# Patient Record
Sex: FEMALE | Race: WHITE | NOT HISPANIC OR LATINO | Employment: UNEMPLOYED | URBAN - METROPOLITAN AREA
[De-identification: names, ages, dates, MRNs, and addresses within clinical notes are randomized per-mention and may not be internally consistent; named-entity substitution may affect disease eponyms.]

---

## 2017-08-28 ENCOUNTER — OFFICE VISIT (OUTPATIENT)
Dept: ORTHOPEDIC SURGERY | Facility: CLINIC | Age: 52
End: 2017-08-28

## 2017-08-28 VITALS — TEMPERATURE: 97.6 F | WEIGHT: 145 LBS | HEIGHT: 64 IN | BODY MASS INDEX: 24.75 KG/M2

## 2017-08-28 DIAGNOSIS — M77.11 RIGHT TENNIS ELBOW: ICD-10-CM

## 2017-08-28 DIAGNOSIS — M25.521 ELBOW PAIN, RIGHT: Primary | ICD-10-CM

## 2017-08-28 PROCEDURE — 20605 DRAIN/INJ JOINT/BURSA W/O US: CPT | Performed by: ORTHOPAEDIC SURGERY

## 2017-08-28 PROCEDURE — 99243 OFF/OP CNSLTJ NEW/EST LOW 30: CPT | Performed by: ORTHOPAEDIC SURGERY

## 2017-08-28 PROCEDURE — 73070 X-RAY EXAM OF ELBOW: CPT | Performed by: ORTHOPAEDIC SURGERY

## 2017-08-28 RX ORDER — METHYLPREDNISOLONE ACETATE 80 MG/ML
80 INJECTION, SUSPENSION INTRA-ARTICULAR; INTRALESIONAL; INTRAMUSCULAR; SOFT TISSUE
Status: COMPLETED | OUTPATIENT
Start: 2017-08-28 | End: 2017-08-28

## 2017-08-28 RX ORDER — BUPIVACAINE HYDROCHLORIDE 5 MG/ML
1 INJECTION, SOLUTION EPIDURAL; INTRACAUDAL
Status: COMPLETED | OUTPATIENT
Start: 2017-08-28 | End: 2017-08-28

## 2017-08-28 RX ORDER — IBUPROFEN 200 MG
200 TABLET ORAL EVERY 6 HOURS PRN
COMMUNITY
End: 2019-10-22 | Stop reason: ALTCHOICE

## 2017-08-28 RX ORDER — LIDOCAINE HYDROCHLORIDE 10 MG/ML
1 INJECTION, SOLUTION EPIDURAL; INFILTRATION; INTRACAUDAL; PERINEURAL
Status: COMPLETED | OUTPATIENT
Start: 2017-08-28 | End: 2017-08-28

## 2017-08-28 RX ADMIN — LIDOCAINE HYDROCHLORIDE 1 ML: 10 INJECTION, SOLUTION EPIDURAL; INFILTRATION; INTRACAUDAL; PERINEURAL at 10:38

## 2017-08-28 RX ADMIN — METHYLPREDNISOLONE ACETATE 80 MG: 80 INJECTION, SUSPENSION INTRA-ARTICULAR; INTRALESIONAL; INTRAMUSCULAR; SOFT TISSUE at 10:38

## 2017-08-28 RX ADMIN — BUPIVACAINE HYDROCHLORIDE 1 ML: 5 INJECTION, SOLUTION EPIDURAL; INTRACAUDAL at 10:38

## 2017-08-28 NOTE — PATIENT INSTRUCTIONS
Lateral Epicondylitis With Rehab  Lateral epicondylitis involves inflammation and pain around the outer portion of the elbow. The pain is caused by inflammation of the tendons in the forearm that bring back (extend) the wrist. Lateral epicondylitis is also called tennis elbow, because it is very common in tennis players. However, it may occur in any individual who extends the wrist repetitively. If lateral epicondylitis is left untreated, it may become a chronic problem.  SYMPTOMS   · Pain, tenderness, and inflammation on the outer (lateral) side of the elbow.  · Pain or weakness with gripping activities.  · Pain that increases with wrist-twisting motions (playing tennis, using a screwdriver, opening a door or a jar).  · Pain with lifting objects, including a coffee cup.  CAUSES   Lateral epicondylitis is caused by inflammation of the tendons that extend the wrist. Causes of injury may include:  · Repetitive stress and strain on the muscles and tendons that extend the wrist.  · Sudden change in activity level or intensity.  · Incorrect  in racquet sports.  · Incorrect  size of racquet (often too large).  · Incorrect hitting position or technique (usually backhand, leading with the elbow).  · Using a racket that is too heavy.  RISK INCREASES WITH:  · Sports or occupations that require repetitive and/or strenuous forearm and wrist movements (tennis, squash, racquetball, carpentry).  · Poor wrist and forearm strength and flexibility.  · Failure to warm up properly before activity.  · Resuming activity before healing, rehabilitation, and conditioning are complete.  PREVENTION   · Warm up and stretch properly before activity.  · Maintain physical fitness:    Strength, flexibility, and endurance.    Cardiovascular fitness.  · Wear and use properly fitted equipment.  · Learn and use proper technique and have a  correct improper technique.  · Wear a tennis elbow (counterforce) brace.  PROGNOSIS   The course of  this condition depends on the degree of the injury. If treated properly, acute cases (symptoms lasting less than 4 weeks) are often resolved in 2 to 6 weeks. Chronic (longer lasting cases) often resolve in 3 to 6 months but may require physical therapy.  RELATED COMPLICATIONS   · Frequently recurring symptoms, resulting in a chronic problem. Properly treating the problem the first time decreases frequency of recurrence.  · Chronic inflammation, scarring tendon degeneration, and partial tendon tear, requiring surgery.  · Delayed healing or resolution of symptoms.  TREATMENT   Treatment first involves the use of ice and medicine to reduce pain and inflammation. Strengthening and stretching exercises may help reduce discomfort if performed regularly. These exercises may be performed at home if the condition is an acute injury. Chronic cases may require a referral to a physical therapist for evaluation and treatment. Your caregiver may advise a corticosteroid injection to help reduce inflammation. Rarely, surgery is needed.  MEDICATION  · If pain medicine is needed, nonsteroidal anti-inflammatory medicines (aspirin and ibuprofen), or other minor pain relievers (acetaminophen), are often advised.  · Do not take pain medicine for 7 days before surgery.  · Prescription pain relievers may be given, if your caregiver thinks they are needed. Use only as directed and only as much as you need.  · Corticosteroid injections may be recommended. These injections should be reserved only for the most severe cases, because they can only be given a certain number of times.  HEAT AND COLD  · Cold treatment (icing) should be applied for 10 to 15 minutes every 2 to 3 hours for inflammation and pain, and immediately after activity that aggravates your symptoms. Use ice packs or an ice massage.  · Heat treatment may be used before performing stretching and strengthening activities prescribed by your caregiver, physical therapist, or  . Use a heat pack or a warm water soak.  SEEK MEDICAL CARE IF:  Symptoms get worse or do not improve in 2 weeks, despite treatment.  EXERCISES   RANGE OF MOTION (ROM) AND STRETCHING EXERCISES - Epicondylitis, Lateral (Tennis Elbow)  These exercises may help you when beginning to rehabilitate your injury. Your symptoms may go away with or without further involvement from your physician, physical therapist, or . While completing these exercises, remember:   · Restoring tissue flexibility helps normal motion to return to the joints. This allows healthier, less painful movement and activity.  · An effective stretch should be held for at least 30 seconds.  · A stretch should never be painful. You should only feel a gentle lengthening or release in the stretched tissue.  RANGE OF MOTION - Wrist Flexion, Active-Assisted  · Extend your right / left elbow with your fingers pointing down.*  · Gently pull the back of your hand towards you, until you feel a gentle stretch on the top of your forearm.  · Hold this position for __________ seconds.  Repeat __________ times. Complete this exercise __________ times per day.   *If directed by your physician, physical therapist or , complete this stretch with your elbow bent, rather than extended.  RANGE OF MOTION - Wrist Extension, Active-Assisted  · Extend your right / left elbow and turn your palm upwards.*  · Gently pull your palm and fingertips back, so your wrist extends and your fingers point more toward the ground.  · You should feel a gentle stretch on the inside of your forearm.  · Hold this position for __________ seconds.  Repeat __________ times. Complete this exercise __________ times per day.  *If directed by your physician, physical therapist or , complete this stretch with your elbow bent, rather than extended.  STRETCH - Wrist Flexion  · Place the back of your right / left hand on a tabletop, leaving your  elbow slightly bent. Your fingers should point away from your body.  · Gently press the back of your hand down onto the table by straightening your elbow. You should feel a stretch on the top of your forearm.  · Hold this position for __________ seconds.  Repeat __________ times. Complete this stretch __________ times per day.   STRETCH - Wrist Extension   · Place your right / left fingertips on a tabletop, leaving your elbow slightly bent. Your fingers should point backwards.  · Gently press your fingers and palm down onto the table by straightening your elbow. You should feel a stretch on the inside of your forearm.  · Hold this position for __________ seconds.  Repeat __________ times. Complete this stretch __________ times per day.   STRENGTHENING EXERCISES - Epicondylitis, Lateral (Tennis Elbow)  These exercises may help you when beginning to rehabilitate your injury. They may resolve your symptoms with or without further involvement from your physician, physical therapist, or . While completing these exercises, remember:   · Muscles can gain both the endurance and the strength needed for everyday activities through controlled exercises.  · Complete these exercises as instructed by your physician, physical therapist or . Increase the resistance and repetitions only as guided.  · You may experience muscle soreness or fatigue, but the pain or discomfort you are trying to eliminate should never worsen during these exercises. If this pain does get worse, stop and make sure you are following the directions exactly. If the pain is still present after adjustments, discontinue the exercise until you can discuss the trouble with your caregiver.  STRENGTH - Wrist Flexors  · Sit with your right / left forearm palm-up and fully supported on a table or countertop. Your elbow should be resting below the height of your shoulder. Allow your wrist to extend over the edge of the  surface.  · Loosely holding a __________ weight, or a piece of rubber exercise band or tubing, slowly curl your hand up toward your forearm.  · Hold this position for __________ seconds. Slowly lower the wrist back to the starting position in a controlled manner.  Repeat __________ times. Complete this exercise __________ times per day.   STRENGTH - Wrist Extensors  · Sit with your right / left forearm palm-down and fully supported on a table or countertop. Your elbow should be resting below the height of your shoulder. Allow your wrist to extend over the edge of the surface.  · Loosely holding a __________ weight, or a piece of rubber exercise band or tubing, slowly curl your hand up toward your forearm.  · Hold this position for __________ seconds. Slowly lower the wrist back to the starting position in a controlled manner.  Repeat __________ times. Complete this exercise __________ times per day.   STRENGTH - Ulnar Deviators  · Stand with a ____________________ weight in your right / left hand, or sit while holding a rubber exercise band or tubing, with your healthy arm supported on a table or countertop.  · Move your wrist, so that your pinkie travels toward your forearm and your thumb moves away from your forearm.  · Hold this position for __________ seconds and then slowly lower the wrist back to the starting position.  Repeat __________ times. Complete this exercise __________ times per day  STRENGTH - Radial Deviators  · Stand with a ____________________ weight in your right / left hand, or sit while holding a rubber exercise band or tubing, with your injured arm supported on a table or countertop.  · Raise your hand upward in front of you or pull up on the rubber tubing.  · Hold this position for __________ seconds and then slowly lower the wrist back to the starting position.  Repeat __________ times. Complete this exercise __________ times per day.  STRENGTH - Forearm Supinators   · Sit with your right /  "left forearm supported on a table, keeping your elbow below shoulder height. Rest your hand over the edge, palm down.  · Gently  a hammer or a soup ladle.  · Without moving your elbow, slowly turn your palm and hand upward to a \"thumbs-up\" position.  · Hold this position for __________ seconds. Slowly return to the starting position.  Repeat __________ times. Complete this exercise __________ times per day.   STRENGTH - Forearm Pronators   · Sit with your right / left forearm supported on a table, keeping your elbow below shoulder height. Rest your hand over the edge, palm up.  · Gently  a hammer or a soup ladle.  · Without moving your elbow, slowly turn your palm and hand upward to a \"thumbs-up\" position.  · Hold this position for __________ seconds. Slowly return to the starting position.  Repeat __________ times. Complete this exercise __________ times per day.   STRENGTH -   · Grasp a tennis ball, a dense sponge, or a large, rolled sock in your hand.  · Squeeze as hard as you can, without increasing any pain.  · Hold this position for __________ seconds. Release your  slowly.  Repeat __________ times. Complete this exercise __________ times per day.   STRENGTH - Elbow Extensors, Isometric  · Stand or sit upright, on a firm surface. Place your right / left arm so that your palm faces your stomach, and it is at the height of your waist.  · Place your opposite hand on the underside of your forearm. Gently push up as your right / left arm resists. Push as hard as you can with both arms, without causing any pain or movement at your right / left elbow. Hold this stationary position for __________ seconds.  Gradually release the tension in both arms. Allow your muscles to relax completely before repeating.     This information is not intended to replace advice given to you by your health care provider. Make sure you discuss any questions you have with your health care provider.     Document Released: " 12/18/2006 Document Revised: 01/08/2016 Document Reviewed: 09/15/2016  [employer] Interactive Patient Education ©2017 [employer]

## 2017-08-28 NOTE — PROGRESS NOTES
Patient Name: Adrian Aviles   YOB: 1965  Referring Primary Care Physician: Kaelyn Mathews MD      Chief Complaint:    Chief Complaint   Patient presents with   • Right Elbow - Establish Care        Subjective:right elbow pain  HPI:   Adrian Aviles is a pleasant 51 y.o. year old who presents today for evaluation of   Chief Complaint   Patient presents with   • Right Elbow - Establish Care   hx of left elbow, neck and shoulder pain but now with new right elbow pain.  Achy/sharp.  Tried stretching, rest, nsaids.  Has been moving into new house and packing boxes.  Doesn't radiate.  Constant.    This problem is new to this examiner.     Medications:   Home Medications:  Current Outpatient Prescriptions on File Prior to Visit   Medication Sig   • CALCIUM PO Take 1,000 mg by mouth daily.   • Cholecalciferol (VITAMIN D PO) Take 2,000 Units by mouth. Does not specify if tablet or capsule.   • escitalopram (LEXAPRO) 10 MG tablet Take 10 mg by mouth daily.   • MAGNESIUM PO Take 500 mg by mouth daily. Does not specify tablets or capsules.   • [DISCONTINUED] cyclobenzaprine (FLEXERIL) 5 MG tablet Take 1 tablet by mouth 3 (Three) Times a Day As Needed for muscle spasms.     No current facility-administered medications on file prior to visit.      Current Medications:  Scheduled Meds:  Continuous Infusions:  No current facility-administered medications for this visit.   PRN Meds:.    I have reviewed the patient's medical history in detail and updated the computerized patient record.  Review and summarization of old records includes:    Past Medical History:   Diagnosis Date   • Anxiety    • Depression         Past Surgical History:   Procedure Laterality Date   • APPENDECTOMY     • BREAST AUGMENTATION     •  SECTION  1997   • CHOLECYSTECTOMY  1997   • OTHER SURGICAL HISTORY  10/2012    Uterine ablation        Social History     Occupational History   • Not on file.     Social History Main  Topics   • Smoking status: Former Smoker   • Smokeless tobacco: Not on file   • Alcohol use No   • Drug use: No   • Sexual activity: Defer    Social History     Social History Narrative        Family History   Problem Relation Age of Onset   • Lung disease Father    • Diabetes Sister    • Lung cancer Other        ROS: 14 point review of systems was performed and all other systems were reviewed and are negative except for documented findings in HPI and today's encounter.     Allergies: No Known Allergies  Constitutional:  Denies fever, shaking or chills   Eyes:  Denies change in visual acuity   HENT:  Denies nasal congestion or sore throat   Respiratory:  Denies cough or shortness of breath   Cardiovascular:  Denies chest pain or severe LE edema   GI:  Denies abdominal pain, nausea, vomiting, bloody stools or diarrhea   Musculoskeletal:  Numbness, tingling, pain, or loss of motor function only as noted above in history of present illness.  : Denies painful urination or hematuria  Integument:  Denies rash, lesion or ulceration   Neurologic:  Denies headache or focal weakness  Endocrine:  Denies lymphadenopathy  Psych:  Denies confusion or change in mental status   Hem:  Denies active bleeding    Objective:    Physical Exam: 51 y.o. female  Body mass index is 24.89 kg/(m^2)., @WT@, @HT@  Vitals:    08/28/17 0957   Temp: 97.6 °F (36.4 °C)     Vital signs reviewed.   General Appearance:    Alert, cooperative, in no acute distress                  Eyes: conjunctiva clear  ENT: external ears and nose atraumatic  CV: no peripheral edema  Resp: normal respiratory effort  Skin: no rashes or wounds; normal turgor  Psych: mood and affect appropriate  Lymph: no nodes appreciated  Neuro: gross sensation intact  Vascular:  Palpable peripheral pulse in noted extremity  Musculoskeletal Extremities: elbow with pain voer the lateraal ext origin.  good rom and stability.  no effusion    Radiology:   Imaging done today and discussed  at length with the patient:    Indication: pain related symptoms,  Views: 2V AP&LAT right elbow   Findings: mild arthritis  Comparison views: not available      Assessment:     ICD-10-CM ICD-9-CM   1. Elbow pain, right M25.521 719.42   2. Right tennis elbow M77.11 726.32        Medium Joint Arthrocentesis  Date/Time: 8/28/2017 10:38 AM  Timeout: Immediately prior to procedure a time out was called to verify the correct patient, procedure, equipment, support staff and site/side marked as required   Supporting Documentation  Indications: pain   Procedure Details  Location: elbow -   Needle size: 22 G  Medications administered: 1 mL lidocaine PF 1% 1 %; 1 mL bupivacaine (PF) 0.5 %; 80 mg methylPREDNISolone acetate 80 MG/ML  Patient tolerance: patient tolerated the procedure well with no immediate complications             Plan: Biomechanics of pertinent body area discussed.  Risks, benefits, alternatives, comparisons, and complications of accepted medicines, injections, recommendations, surgical procedures, and therapies explained and education provided in laymen's terms. The patient was given the opportunity to ask questions and they were answerved to their satisfaction.   Natural history and expected course of this patient's diagnosis discussed along with evaluation of therapies. Questions answered.  Cortisone Injection for DIAGNOSTIC and THERAPUTIC purposes.  Cryotherapy/brachy therapy as indicated with instructions.    Inject- if no better could do PT or MRI>      8/28/2017

## 2017-11-06 ENCOUNTER — OFFICE VISIT (OUTPATIENT)
Dept: ORTHOPEDIC SURGERY | Facility: CLINIC | Age: 52
End: 2017-11-06

## 2017-11-06 VITALS — HEIGHT: 63 IN | BODY MASS INDEX: 24.8 KG/M2 | TEMPERATURE: 98 F | WEIGHT: 140 LBS

## 2017-11-06 DIAGNOSIS — M77.11 RIGHT TENNIS ELBOW: Primary | ICD-10-CM

## 2017-11-06 PROCEDURE — 99213 OFFICE O/P EST LOW 20 MIN: CPT | Performed by: ORTHOPAEDIC SURGERY

## 2017-11-06 RX ORDER — ESTRADIOL 0.05 MG/D
1 FILM, EXTENDED RELEASE TRANSDERMAL WEEKLY
COMMUNITY
Start: 2017-09-25

## 2017-11-06 RX ORDER — METHYLPREDNISOLONE 4 MG/1
TABLET ORAL
Qty: 21 TABLET | Refills: 0 | Status: SHIPPED | OUTPATIENT
Start: 2017-11-06 | End: 2018-01-03

## 2017-11-06 NOTE — PROGRESS NOTES
Patient Name: Adrian Aviles   YOB: 1965  Referring Primary Care Physician: Kaelyn Mathews MD    2  Chief Complaint:    Chief Complaint   Patient presents with   • Right Elbow - Follow-up, Pain        Subjective:    HPI:   Adrian Aviles is a pleasant 52 y.o. year old who presents today for evaluation of   Chief Complaint   Patient presents with   • Right Elbow - Follow-up, Pain   .  Had bad flare of right tennis elbow.  inejcted about 2 months ago.  Digging irrigation ditches - inflammed again.  Stretching. [place], ice.  Better in morning and worse in the afternoon.     This problem is not new to this examiner.     Medications:   Home Medications:  Current Outpatient Prescriptions on File Prior to Visit   Medication Sig   • CALCIUM PO Take 1,000 mg by mouth daily.   • Cholecalciferol (VITAMIN D PO) Take 2,000 Units by mouth. Does not specify if tablet or capsule.   • escitalopram (LEXAPRO) 10 MG tablet Take 10 mg by mouth daily.   • ibuprofen (ADVIL,MOTRIN) 200 MG tablet Take 200 mg by mouth Every 6 (Six) Hours As Needed for Mild Pain .   • MAGNESIUM PO Take 500 mg by mouth daily. Does not specify tablets or capsules.     No current facility-administered medications on file prior to visit.      Current Medications:  Scheduled Meds:  Continuous Infusions:  No current facility-administered medications for this visit.   PRN Meds:.    I have reviewed the patient's medical history in detail and updated the computerized patient record.  Review and summarization of old records includes:    Past Medical History:   Diagnosis Date   • Anxiety    • Depression         Past Surgical History:   Procedure Laterality Date   • APPENDECTOMY     • BREAST AUGMENTATION     •  SECTION  1997   • CHOLECYSTECTOMY  1997   • OTHER SURGICAL HISTORY  10/2012    Uterine ablation        Social History     Occupational History   • Not on file.     Social History Main Topics   • Smoking status: Former Smoker   •  Smokeless tobacco: Never Used   • Alcohol use No   • Drug use: No   • Sexual activity: Defer    Social History     Social History Narrative        Family History   Problem Relation Age of Onset   • Lung disease Father    • Diabetes Sister    • Lung cancer Other        ROS: 14 point review of systems was performed and all other systems were reviewed and are negative except for documented findings in HPI and today's encounter.     Allergies: No Known Allergies  Constitutional:  Denies fever, shaking or chills   Eyes:  Denies change in visual acuity   HENT:  Denies nasal congestion or sore throat   Respiratory:  Denies cough or shortness of breath   Cardiovascular:  Denies chest pain or severe LE edema   GI:  Denies abdominal pain, nausea, vomiting, bloody stools or diarrhea   Musculoskeletal:  Numbness, tingling, pain, or loss of motor function only as noted above in history of present illness.  : Denies painful urination or hematuria  Integument:  Denies rash, lesion or ulceration   Neurologic:  Denies headache or focal weakness  Endocrine:  Denies lymphadenopathy  Psych:  Denies confusion or change in mental status   Hem:  Denies active bleeding    Objective:    Physical Exam: 52 y.o. female  Body mass index is 24.8 kg/(m^2)., @WT@, @HT@  Vitals:    11/06/17 0858   Temp: 98 °F (36.7 °C)     Vital signs reviewed.   General Appearance:    Alert, cooperative, in no acute distress                  Eyes: conjunctiva clear  ENT: external ears and nose atraumatic  CV: no peripheral edema  Resp: normal respiratory effort  Skin: no rashes or wounds; normal turgor  Psych: mood and affect appropriate  Lymph: no nodes appreciated  Neuro: gross sensation intact  Vascular:  Palpable peripheral pulse in noted extremity  Musculoskeletal Extremities: tender over the lat epicondyle on the right, resisted ext/passive lfexion hurt, pulse and sensation intact    Radiology:   Imaging done previously in the office and discussed at  length with the patient:    Indication: pain related symptoms,  Views: 2V AP&LAT right elbow   Findings: mild arthritis  Comparison views: not available      Assessment:     ICD-10-CM ICD-9-CM   1. Right tennis elbow M77.11 726.32        Procedures       Plan: Biomechanics of pertinent body area discussed.  Risks, benefits, alternatives, comparisons, and complications of accepted medicines, injections, recommendations, surgical procedures, and therapies explained and education provided in laymen's terms. The patient was given the opportunity to ask questions and they were answerved to their satisfaction.   Natural history and expected course of this patient's diagnosis discussed along with evaluation of therapies. Questions answered.  OTC analgesics as needed with dosage warning and instructions given.  Cryotherapy/brachy therapy as indicated with instructions.   PT referral offered and declined, advised on stretching/strengthening exercises.   Medrol dosepack athe nreume nsaids with regularity and warnings.  Consider injectio in aboutr 6 wks when back from [facility] if no better.      11/6/2017

## 2018-01-03 ENCOUNTER — OFFICE VISIT (OUTPATIENT)
Dept: ORTHOPEDIC SURGERY | Facility: CLINIC | Age: 53
End: 2018-01-03

## 2018-01-03 VITALS — WEIGHT: 141 LBS | HEIGHT: 63 IN | BODY MASS INDEX: 24.98 KG/M2 | TEMPERATURE: 99.7 F

## 2018-01-03 DIAGNOSIS — M77.11 RIGHT TENNIS ELBOW: Primary | ICD-10-CM

## 2018-01-03 PROCEDURE — 99213 OFFICE O/P EST LOW 20 MIN: CPT | Performed by: NURSE PRACTITIONER

## 2018-01-03 PROCEDURE — 20605 DRAIN/INJ JOINT/BURSA W/O US: CPT | Performed by: NURSE PRACTITIONER

## 2018-01-03 RX ORDER — METHYLPREDNISOLONE ACETATE 80 MG/ML
80 INJECTION, SUSPENSION INTRA-ARTICULAR; INTRALESIONAL; INTRAMUSCULAR; SOFT TISSUE
Status: COMPLETED | OUTPATIENT
Start: 2018-01-03 | End: 2018-01-03

## 2018-01-03 RX ORDER — VALACYCLOVIR HYDROCHLORIDE 1 G/1
TABLET, FILM COATED ORAL AS NEEDED
COMMUNITY
Start: 2017-11-27

## 2018-01-03 RX ORDER — LIDOCAINE HYDROCHLORIDE 10 MG/ML
1 INJECTION, SOLUTION EPIDURAL; INFILTRATION; INTRACAUDAL; PERINEURAL
Status: COMPLETED | OUTPATIENT
Start: 2018-01-03 | End: 2018-01-03

## 2018-01-03 RX ORDER — BUPIVACAINE HYDROCHLORIDE 5 MG/ML
1 INJECTION, SOLUTION EPIDURAL; INTRACAUDAL
Status: COMPLETED | OUTPATIENT
Start: 2018-01-03 | End: 2018-01-03

## 2018-01-03 RX ADMIN — LIDOCAINE HYDROCHLORIDE 1 ML: 10 INJECTION, SOLUTION EPIDURAL; INFILTRATION; INTRACAUDAL; PERINEURAL at 15:35

## 2018-01-03 RX ADMIN — METHYLPREDNISOLONE ACETATE 80 MG: 80 INJECTION, SUSPENSION INTRA-ARTICULAR; INTRALESIONAL; INTRAMUSCULAR; SOFT TISSUE at 15:35

## 2018-01-03 RX ADMIN — BUPIVACAINE HYDROCHLORIDE 1 ML: 5 INJECTION, SOLUTION EPIDURAL; INTRACAUDAL at 15:35

## 2018-01-03 NOTE — PROGRESS NOTES
Patient Name: Adrian Aviles   YOB: 1965  Referring Primary Care Physician: Kaelyn Mathews MD      Chief Complaint:    Chief Complaint   Patient presents with   • Right Elbow - Follow-up, Pain        Subjective:    HPI:   Adrian Aviles is a pleasant 52 y.o. year old who presents today for evaluation of   Chief Complaint   Patient presents with   • Right Elbow - Follow-up, Pain   .  right elbow tender at the extensor origin and with passive stretch.  Tried icing, extercises, nsaids and medrol.  Injection about 3 months ago    This problem is not new to this examiner.     Medications:   Home Medications:  Current Outpatient Prescriptions on File Prior to Visit   Medication Sig   • CALCIUM PO Take 1,000 mg by mouth daily.   • Cholecalciferol (VITAMIN D PO) Take 2,000 Units by mouth. Does not specify if tablet or capsule.   • escitalopram (LEXAPRO) 10 MG tablet Take 10 mg by mouth daily.   • estradiol (MINIVELLE) 0.05 MG/24HR patch Place 1 patch on the skin 1 (One) Time Per Week.   • ibuprofen (ADVIL,MOTRIN) 200 MG tablet Take 200 mg by mouth Every 6 (Six) Hours As Needed for Mild Pain .   • MAGNESIUM PO Take 500 mg by mouth daily. Does not specify tablets or capsules.   • progesterone (PROMETRIUM) 100 MG capsule Take 100 mg by mouth Daily.   • [DISCONTINUED] MethylPREDNISolone (MEDROL, IZZY,) 4 MG tablet Take as directed on package instructions.     No current facility-administered medications on file prior to visit.      Current Medications:  Scheduled Meds:  Continuous Infusions:  No current facility-administered medications for this visit.   PRN Meds:.    I have reviewed the patient's medical history in detail and updated the computerized patient record.  Review and summarization of old records includes:    Past Medical History:   Diagnosis Date   • Anxiety    • Depression         Past Surgical History:   Procedure Laterality Date   • APPENDECTOMY     • BREAST AUGMENTATION     •  SECTION   02/1997   • CHOLECYSTECTOMY  04/1997   • OTHER SURGICAL HISTORY  10/2012    Uterine ablation        Social History     Occupational History   • Not on file.     Social History Main Topics   • Smoking status: Former Smoker   • Smokeless tobacco: Never Used   • Alcohol use No   • Drug use: No   • Sexual activity: Defer    Social History     Social History Narrative        Family History   Problem Relation Age of Onset   • Lung disease Father    • Diabetes Sister    • Lung cancer Other        ROS: 14 point review of systems was performed and all other systems were reviewed and are negative except for documented findings in HPI and today's encounter.     Allergies: No Known Allergies  Constitutional:  Denies fever, shaking or chills   Eyes:  Denies change in visual acuity   HENT:  Denies nasal congestion or sore throat   Respiratory:  Denies cough or shortness of breath   Cardiovascular:  Denies chest pain or severe LE edema   GI:  Denies abdominal pain, nausea, vomiting, bloody stools or diarrhea   Musculoskeletal:  Numbness, tingling, pain, or loss of motor function only as noted above in history of present illness.  : Denies painful urination or hematuria  Integument:  Denies rash, lesion or ulceration   Neurologic:  Denies headache or focal weakness  Endocrine:  Denies lymphadenopathy  Psych:  Denies confusion or change in mental status   Hem:  Denies active bleeding    Objective:    Physical Exam: 52 y.o. female  Body mass index is 24.98 kg/(m^2)., @WT@, @HT@  Vitals:    01/03/18 1523   Temp: 99.7 °F (37.6 °C)     Vital signs reviewed.   General Appearance:    Alert, cooperative, in no acute distress                  Eyes: conjunctiva clear  ENT: external ears and nose atraumatic  CV: no peripheral edema  Resp: normal respiratory effort  Skin: no rashes or wounds; normal turgor  Psych: mood and affect appropriate  Lymph: no nodes appreciated  Neuro: gross sensation intact  Vascular:  Palpable peripheral pulse in  noted extremity  Musculoskeletal Extremities: tender at ext origin and with active resistance and passive stretch.        Assessment:     ICD-10-CM ICD-9-CM   1. Right tennis elbow M77.11 726.32        Medium Joint Arthrocentesis  Date/Time: 1/3/2018 3:35 PM  Consent given by: patient  Site marked: site marked  Timeout: Immediately prior to procedure a time out was called to verify the correct patient, procedure, equipment, support staff and site/side marked as required   Supporting Documentation  Indications: pain   Procedure Details  Location: elbow - R elbow  Preparation: Patient was prepped and draped in the usual sterile fashion  Needle size: 22 G  Medications administered: 1 mL bupivacaine (PF) 0.5 %; 80 mg methylPREDNISolone acetate 80 MG/ML; 1 mL lidocaine PF 1% 1 %  Patient tolerance: patient tolerated the procedure well with no immediate complications             Plan: Biomechanics of pertinent body area discussed.  Risks, benefits, alternatives, comparisons, and complications of accepted medicines, injections, recommendations, surgical procedures, and therapies explained and education provided in laymen's terms. The patient was given the opportunity to ask questions and they were answerved to their satisfaction.   Natural history and expected course of this patient's diagnosis discussed along with evaluation of therapies. Questions answered.  Cortisone Injection. See procedure note.  Rest, ice, compression, and elevation (RICE) therapy.      1/3/2018

## 2019-07-16 ENCOUNTER — TELEPHONE (OUTPATIENT)
Dept: ORTHOPEDIC SURGERY | Facility: CLINIC | Age: 54
End: 2019-07-16

## 2019-07-16 NOTE — TELEPHONE ENCOUNTER
Patient says she is both patient and friend of [facility]. She is in [place] and broke her left 5th metatarsal on 7/14. Went to  in [facility] and then saw ortho yesterday and had a cast put on. She is NWB x 2 weeks. Will be in [place] the week of 7/29 and wants [facility] to see her in f/u for this. Please advise.

## 2019-07-17 NOTE — TELEPHONE ENCOUNTER
Notified patient of [facility] reply and scheduled patient to see [facility] on Tues 7/30/19 at 0800 for OPNC / LEFT 5th MT FX / DOI & XR 7/14/19 ([place] [facility] to bring disc) / per [facility]

## 2019-07-31 ENCOUNTER — OFFICE VISIT (OUTPATIENT)
Dept: ORTHOPEDIC SURGERY | Facility: CLINIC | Age: 54
End: 2019-07-31

## 2019-07-31 VITALS — WEIGHT: 128 LBS | BODY MASS INDEX: 22.68 KG/M2 | HEIGHT: 63 IN

## 2019-07-31 DIAGNOSIS — S92.355A NONDISPLACED FRACTURE OF FIFTH METATARSAL BONE, LEFT FOOT, INITIAL ENCOUNTER FOR CLOSED FRACTURE: Primary | ICD-10-CM

## 2019-07-31 PROCEDURE — 99213 OFFICE O/P EST LOW 20 MIN: CPT | Performed by: ORTHOPAEDIC SURGERY

## 2019-07-31 RX ORDER — UBIDECARENONE 75 MG
50 CAPSULE ORAL DAILY
COMMUNITY

## 2019-07-31 RX ORDER — PRASTERONE (DHEA) 50 MG
TABLET ORAL
COMMUNITY

## 2019-07-31 NOTE — PROGRESS NOTES
Patient Name: Adrian Aviles   YOB: 1965  Referring Primary Care Physician: Kaelyn Mathews MD  BMI: Body mass index is 22.67 kg/m².    Chief Complaint:    Chief Complaint   Patient presents with   • Left Foot - Establish Care, Pain        HPI:     Adrian Aviles is a 53 y.o. female who presents today for evaluation of   Chief Complaint   Patient presents with   • Left Foot - Establish Care, Pain   .  Adrian is seen today about 2-1/2 weeks after miss stepping and sustaining a fracture of her left fifth metatarsal in [place].  She was put in a nonweightbearing cast and came back here today.    This problem is new new to this examiner.     Subjective   Medications:   Home Medications:  Current Outpatient Medications on File Prior to Visit   Medication Sig   • CALCIUM PO Take 1,000 mg by mouth daily.   • Cholecalciferol (VITAMIN D PO) Take 2,000 Units by mouth. Does not specify if tablet or capsule.   • DHEA 50 MG tablet Take  by mouth.   • escitalopram (LEXAPRO) 10 MG tablet Take 10 mg by mouth daily.   • estradiol (MINIVELLE) 0.05 MG/24HR patch Place 1 patch on the skin 1 (One) Time Per Week.   • ibuprofen (ADVIL,MOTRIN) 200 MG tablet Take 200 mg by mouth Every 6 (Six) Hours As Needed for Mild Pain .   • MAGNESIUM PO Take 500 mg by mouth daily. Does not specify tablets or capsules.   • progesterone (PROMETRIUM) 100 MG capsule Take 100 mg by mouth Daily.   • progesterone (PROMETRIUM) 200 MG capsule    • valACYclovir (VALTREX) 1000 MG tablet As Needed.   • vitamin B-12 (CYANOCOBALAMIN) 100 MCG tablet Take 50 mcg by mouth Daily.     No current facility-administered medications on file prior to visit.      Current Medications:  Scheduled Meds:  Continuous Infusions:  No current facility-administered medications for this visit.   PRN Meds:.    I have reviewed the patient's medical history in detail and updated the computerized patient record.  Review and summarization of old records includes:    Past  "Medical History:   Diagnosis Date   • Anxiety    • Depression         Past Surgical History:   Procedure Laterality Date   • APPENDECTOMY     • BREAST AUGMENTATION     •  SECTION  1997   • CHOLECYSTECTOMY  1997   • OTHER SURGICAL HISTORY  10/2012    Uterine ablation        Social History     Occupational History   • Not on file   Tobacco Use   • Smoking status: Former Smoker   • Smokeless tobacco: Never Used   Substance and Sexual Activity   • Alcohol use: No   • Drug use: No   • Sexual activity: Defer    Social History     Social History Narrative   • Not on file        Family History   Problem Relation Age of Onset   • Lung disease Father    • Diabetes Sister    • Lung cancer Other        ROS: 14 point review of systems was performed and all other systems were reviewed and are negative except for documented findings in HPI and today's encounter.     Allergies: No Known Allergies  Constitutional:  Denies fever, shaking or chills   Eyes:  Denies change in visual acuity   HENT:  Denies nasal congestion or sore throat   Respiratory:  Denies cough or shortness of breath   Cardiovascular:  Denies chest pain or severe LE edema   GI:  Denies abdominal pain, nausea, vomiting, bloody stools or diarrhea   Musculoskeletal:  Numbness, tingling, pain, or loss of motor function only as noted above in history of present illness.  : Denies painful urination or hematuria  Integument:  Denies rash, lesion or ulceration   Neurologic:  Denies headache or focal weakness  Endocrine:  Denies lymphadenopathy  Psych:  Denies confusion or change in mental status   Hem:  Denies active bleeding    OBJECTIVE:  Physical Exam:   Ht 160 cm (63\")   Wt 58.1 kg (128 lb)   BMI 22.67 kg/m²     General Appearance:    Alert, cooperative, in no acute distress                  Eyes: conjunctiva clear  ENT: external ears and nose atraumatic  CV: no peripheral edema  Resp: normal respiratory effort  Skin: no rashes or wounds; normal " turgor  Psych: mood and affect appropriate  Lymph: no nodes appreciated  Neuro: gross sensation intact  Vascular:  Palpable peripheral pulse in noted extremity  Musculoskeletal Extremities: Cast is removed her bruising and swelling that she says she has is resolved she really does not have much tenderness near the fifth metatarsal and her calf is soft she is a little stiff in the feet and toes    Radiology:   AP lateral oblique x-rays of her left foot compared to films from [place] dated 7/14/2019 show interim healing of a proximal fifth metatarsal fracture with evidence of radiographic healing    Assessment: No diagnosis found.     Procedures       Plan: RICE: Rest, ice, compression, and elevation therapy, Cryotherapy/brachy therapy, and or OTC linaments as indicated with instructions. Advised on her fracture want her to continue to immobilize in a boot but can do partial weightbearing with her crutches icing elevation etc. etc. see her back in about 3 weeks with repeat x-rays to assure that she is healing and she saw the doctor in [place] who placed the [place] want to do her follow-up care here with me.      7/31/2019    Much of this encounter note is an electronic transcription/translation of spoken language to printed text. The electronic translation of spoken language may permit erroneous, or at times, nonsensical words or phrases to be inadvertently transcribed; Although I have reviewed the note for such errors, some may still exist

## 2019-08-16 ENCOUNTER — OFFICE VISIT (OUTPATIENT)
Dept: ORTHOPEDIC SURGERY | Facility: CLINIC | Age: 54
End: 2019-08-16

## 2019-08-16 VITALS — WEIGHT: 130 LBS | HEIGHT: 63 IN | BODY MASS INDEX: 23.04 KG/M2

## 2019-08-16 DIAGNOSIS — S92.355A NONDISPLACED FRACTURE OF FIFTH METATARSAL BONE, LEFT FOOT, INITIAL ENCOUNTER FOR CLOSED FRACTURE: Primary | ICD-10-CM

## 2019-08-16 PROCEDURE — 73630 X-RAY EXAM OF FOOT: CPT | Performed by: ORTHOPAEDIC SURGERY

## 2019-08-16 PROCEDURE — 99213 OFFICE O/P EST LOW 20 MIN: CPT | Performed by: ORTHOPAEDIC SURGERY

## 2019-08-16 NOTE — PROGRESS NOTES
Patient Name: Adrian Aviles   YOB: 1965  Referring Primary Care Physician: Kaelyn Mathews MD  BMI: Body mass index is 23.03 kg/m².    Chief Complaint:    Chief Complaint   Patient presents with   • Left Foot - Follow-up, Pain   • Post-op        HPI:     Adrian Aviles is a 53 y.o. female who presents today for evaluation of   Chief Complaint   Patient presents with   • Left Foot - Follow-up, Pain   • Post-op   .  Adrian follows up today about 5 weeks after a left fifth metatarsal fracture.  She is been in a cast and then a boot.  She says it feels much much better.  Today she has very minimal tenderness over the area with none laterally or superiorly just a little on the plantar surface she is able to walk in the boot without a limp    This problem is not new to this examiner.     Subjective   Medications:   Home Medications:  Current Outpatient Medications on File Prior to Visit   Medication Sig   • CALCIUM PO Take 1,000 mg by mouth daily.   • Cholecalciferol (VITAMIN D PO) Take 2,000 Units by mouth. Does not specify if tablet or capsule.   • DHEA 50 MG tablet Take  by mouth.   • escitalopram (LEXAPRO) 10 MG tablet Take 10 mg by mouth daily.   • estradiol (MINIVELLE) 0.05 MG/24HR patch Place 1 patch on the skin 1 (One) Time Per Week.   • ibuprofen (ADVIL,MOTRIN) 200 MG tablet Take 200 mg by mouth Every 6 (Six) Hours As Needed for Mild Pain .   • MAGNESIUM PO Take 500 mg by mouth daily. Does not specify tablets or capsules.   • progesterone (PROMETRIUM) 100 MG capsule Take 100 mg by mouth Daily.   • progesterone (PROMETRIUM) 200 MG capsule    • valACYclovir (VALTREX) 1000 MG tablet As Needed.   • vitamin B-12 (CYANOCOBALAMIN) 100 MCG tablet Take 50 mcg by mouth Daily.     No current facility-administered medications on file prior to visit.      Current Medications:  Scheduled Meds:  Continuous Infusions:  No current facility-administered medications for this visit.   PRN Meds:.    I have reviewed  "the patient's medical history in detail and updated the computerized patient record.  Review and summarization of old records includes:    Past Medical History:   Diagnosis Date   • Anxiety    • Depression         Past Surgical History:   Procedure Laterality Date   • APPENDECTOMY     • BREAST AUGMENTATION     •  SECTION  1997   • CHOLECYSTECTOMY  1997   • OTHER SURGICAL HISTORY  10/2012    Uterine ablation        Social History     Occupational History   • Not on file   Tobacco Use   • Smoking status: Former Smoker   • Smokeless tobacco: Never Used   Substance and Sexual Activity   • Alcohol use: No   • Drug use: No   • Sexual activity: Defer    Social History     Social History Narrative   • Not on file        Family History   Problem Relation Age of Onset   • Lung disease Father    • Diabetes Sister    • Lung cancer Other        ROS: 14 point review of systems was performed and all other systems were reviewed and are negative except for documented findings in HPI and today's encounter.     Allergies: No Known Allergies  Constitutional:  Denies fever, shaking or chills   Eyes:  Denies change in visual acuity   HENT:  Denies nasal congestion or sore throat   Respiratory:  Denies cough or shortness of breath   Cardiovascular:  Denies chest pain or severe LE edema   GI:  Denies abdominal pain, nausea, vomiting, bloody stools or diarrhea   Musculoskeletal:  Numbness, tingling, pain, or loss of motor function only as noted above in history of present illness.  : Denies painful urination or hematuria  Integument:  Denies rash, lesion or ulceration   Neurologic:  Denies headache or focal weakness  Endocrine:  Denies lymphadenopathy  Psych:  Denies confusion or change in mental status   Hem:  Denies active bleeding    OBJECTIVE:  Physical Exam:   Ht 160 cm (63\")   Wt 59 kg (130 lb)   BMI 23.03 kg/m²     General Appearance:    Alert, cooperative, in no acute distress                  Eyes: conjunctiva " clear  ENT: external ears and nose atraumatic  CV: no peripheral edema  Resp: normal respiratory effort  Skin: no rashes or wounds; normal turgor  Psych: mood and affect appropriate  Lymph: no nodes appreciated  Neuro: gross sensation intact  Vascular:  Palpable peripheral pulse in noted extremity  Musculoskeletal Extremities: Animal tenderness noted she is able to walk with a limp the bruising and swelling are gone    Radiology:   AP lateral oblique left foot compared to old x-rays show evidence of healing.  She can still see the fracture line.    Assessment:     ICD-10-CM ICD-9-CM   1. Nondisplaced fracture of fifth metatarsal bone, left foot, initial encounter for closed fracture S92.355A 825.25        Procedures       Plan: RICE: Rest, ice, compression, and elevation therapy, Cryotherapy/brachy therapy, and or OTC linaments as indicated with instructions. As it heals explained to her if she has no pain she can progress weightbearing and wean out of the boot.  If it hurts she should immobilize and take weight off of it I want to see her in a month or so for an x-ray.  She is going back to her home in [place] not sure when she will get back she think she will be back at some point but when she knows she can make an appointment to get an x-ray      8/16/2019    Much of this encounter note is an electronic transcription/translation of spoken language to printed text. The electronic translation of spoken language may permit erroneous, or at times, nonsensical words or phrases to be inadvertently transcribed; Although I have reviewed the note for such errors, some may still exist

## 2019-09-25 ENCOUNTER — OFFICE VISIT (OUTPATIENT)
Dept: ORTHOPEDIC SURGERY | Facility: CLINIC | Age: 54
End: 2019-09-25

## 2019-09-25 VITALS — HEIGHT: 63 IN | BODY MASS INDEX: 22.5 KG/M2 | WEIGHT: 127 LBS

## 2019-09-25 DIAGNOSIS — G89.11 ACUTE PAIN OF RIGHT SHOULDER DUE TO TRAUMA: ICD-10-CM

## 2019-09-25 DIAGNOSIS — R52 PAIN: Primary | ICD-10-CM

## 2019-09-25 DIAGNOSIS — M25.511 ACUTE PAIN OF RIGHT SHOULDER DUE TO TRAUMA: ICD-10-CM

## 2019-09-25 PROCEDURE — 99213 OFFICE O/P EST LOW 20 MIN: CPT | Performed by: ORTHOPAEDIC SURGERY

## 2019-09-25 PROCEDURE — 73630 X-RAY EXAM OF FOOT: CPT | Performed by: ORTHOPAEDIC SURGERY

## 2019-09-25 NOTE — PROGRESS NOTES
Patient Name: Adrian Aviles   YOB: 1965  Referring Primary Care Physician: Kaelyn Mathews MD  BMI: Body mass index is 22.5 kg/m².    Chief Complaint:    Chief Complaint   Patient presents with   • Left Foot - Follow-up, Pain   Right shoulder pain    HPI:     Adrian Aviles is a 54 y.o. female who presents today for evaluation of   Chief Complaint   Patient presents with   • Left Foot - Follow-up, Pain   .  Patient presents today about 2 months status post a left fifth metatarsal fracture.  Is doing very well for.  He gets a little sore in fact she is wearing a boot today however she is able to walk on her tiptoes do most activities she is traveling and doing a lot of lifting etc.  She says that when she fell 2 months ago and injured her right foot she rhabdo the right shoulder she says she has an intermittent sharp pain in the shoulder that is positionally related and can be when she is pulling the covers over turning over at night.      This problem is/is not new to this examiner.     Subjective   Medications:   Home Medications:  Current Outpatient Medications on File Prior to Visit   Medication Sig   • CALCIUM PO Take 1,000 mg by mouth daily.   • Cholecalciferol (VITAMIN D PO) Take 2,000 Units by mouth. Does not specify if tablet or capsule.   • DHEA 50 MG tablet Take  by mouth.   • escitalopram (LEXAPRO) 10 MG tablet Take 10 mg by mouth daily.   • estradiol (MINIVELLE) 0.05 MG/24HR patch Place 1 patch on the skin 1 (One) Time Per Week.   • ibuprofen (ADVIL,MOTRIN) 200 MG tablet Take 200 mg by mouth Every 6 (Six) Hours As Needed for Mild Pain .   • MAGNESIUM PO Take 500 mg by mouth daily. Does not specify tablets or capsules.   • progesterone (PROMETRIUM) 100 MG capsule Take 100 mg by mouth Daily.   • progesterone (PROMETRIUM) 200 MG capsule    • valACYclovir (VALTREX) 1000 MG tablet As Needed.   • vitamin B-12 (CYANOCOBALAMIN) 100 MCG tablet Take 50 mcg by mouth Daily.     No current  facility-administered medications on file prior to visit.      Current Medications:  Scheduled Meds:  Continuous Infusions:  No current facility-administered medications for this visit.   PRN Meds:.    I have reviewed the patient's medical history in detail and updated the computerized patient record.  Review and summarization of old records includes:    Past Medical History:   Diagnosis Date   • Anxiety    • Depression         Past Surgical History:   Procedure Laterality Date   • APPENDECTOMY     • BREAST AUGMENTATION     •  SECTION  1997   • CHOLECYSTECTOMY  1997   • OTHER SURGICAL HISTORY  10/2012    Uterine ablation        Social History     Occupational History   • Not on file   Tobacco Use   • Smoking status: Former Smoker   • Smokeless tobacco: Never Used   Substance and Sexual Activity   • Alcohol use: No   • Drug use: No   • Sexual activity: Defer    Social History     Social History Narrative   • Not on file        Family History   Problem Relation Age of Onset   • Lung disease Father    • Diabetes Sister    • Lung cancer Other        ROS: 14 point review of systems was performed and all other systems were reviewed and are negative except for documented findings in HPI and today's encounter.     Allergies: No Known Allergies  Constitutional:  Denies fever, shaking or chills   Eyes:  Denies change in visual acuity   HENT:  Denies nasal congestion or sore throat   Respiratory:  Denies cough or shortness of breath   Cardiovascular:  Denies chest pain or severe LE edema   GI:  Denies abdominal pain, nausea, vomiting, bloody stools or diarrhea   Musculoskeletal:  Numbness, tingling, pain, or loss of motor function only as noted above in history of present illness.  : Denies painful urination or hematuria  Integument:  Denies rash, lesion or ulceration   Neurologic:  Denies headache or focal weakness  Endocrine:  Denies lymphadenopathy  Psych:  Denies confusion or change in mental  "status   Hem:  Denies active bleeding    OBJECTIVE:  Physical Exam: 54 y.o. female  Wt Readings from Last 3 Encounters:   09/25/19 57.6 kg (127 lb)   08/16/19 59 kg (130 lb)   07/31/19 58.1 kg (128 lb)     Ht Readings from Last 1 Encounters:   09/25/19 160 cm (63\")     Body mass index is 22.5 kg/m².  There were no vitals filed for this visit.  Vital signs reviewed.     General Appearance:    Alert, cooperative, in no acute distress                  Eyes: conjunctiva clear  ENT: external ears and nose atraumatic  CV: no peripheral edema  Resp: normal respiratory effort  Skin: no rashes or wounds; normal turgor  Psych: mood and affect appropriate  Lymph: no nodes appreciated  Neuro: gross sensation intact  Vascular:  Palpable peripheral pulse in noted extremity  Musculoskeletal Extremities: Exam today shows she walks without limp there is really no swelling or subjective tenderness to palpation along her fifth metatarsal she can walk on her tiptoes.  Her right shoulder has pain with resisted abduction and with stress in the posterior capsule.  She also has a reproducible pop in her shoulder with inferior subluxation of the shoulder when he pulled down actually in a positive impingement.  Strength is good but she has pain    Radiology:   3 views left foot compared to old view show what appears to be bridging callus.  You can still see the fracture.  Clinically is healing.    Assessment:     ICD-10-CM ICD-9-CM   1. Pain R52 780.96   2. Acute pain of right shoulder due to trauma M25.511 719.41    G89.11 338.11        Procedures       Plan: Biomechanics of pertinent body area discussed.  Risks, benefits, alternatives, comparisons, and complications of accepted medicines, injections, recommendations, surgical procedures, and therapies explained and education provided in laymen's terms. Natural history and expected course of this patient's diagnosis discussed along with evaluation of therapies. Questions answered. When " appropriate I also discussed proper use of cane, walker, trekking poles.   Ordered an MRI of the shoulder and will refer her for shoulder opinion if she has this painful pop and a history of trauma.  Far as her foot goes and appears to be healing and although radiographically lacking a little bit.  She has good clinical healing.  If she has more trouble she would let us know and we could do something like check a CAT scan refer to Dr. Hartley however I believe it to be clinically healed at this time    9/25/2019    Much of this encounter note is an electronic transcription/translation of spoken language to printed text. The electronic translation of spoken language may permit erroneous, or at times, nonsensical words or phrases to be inadvertently transcribed; Although I have reviewed the note for such errors, some may still exist

## 2019-10-15 ENCOUNTER — TELEPHONE (OUTPATIENT)
Dept: ORTHOPEDIC SURGERY | Facility: CLINIC | Age: 54
End: 2019-10-15

## 2019-10-15 DIAGNOSIS — F41.9 ANXIETY DUE TO INVASIVE PROCEDURE: Primary | ICD-10-CM

## 2019-10-15 RX ORDER — DIAZEPAM 5 MG/1
TABLET ORAL
Qty: 2 TABLET | Refills: 0 | Status: SHIPPED | OUTPATIENT
Start: 2019-10-15

## 2019-10-15 NOTE — TELEPHONE ENCOUNTER
Regarding: Prescription Question  Contact: [phone]  ----- Message from Fundera, Generic sent at 10/15/2019  2:27 PM EDT -----  MY CHART MESSAGE:    I am having an   MRI  on Monday the 21st of October and was wondering if Dr Wilkinson could write a prescription for 5mg of Valium as I am claustrophobic

## 2019-10-21 ENCOUNTER — HOSPITAL ENCOUNTER (OUTPATIENT)
Dept: MRI IMAGING | Facility: HOSPITAL | Age: 54
Discharge: HOME | End: 2019-10-21
Admitting: ORTHOPAEDIC SURGERY

## 2019-10-21 DIAGNOSIS — M25.511 ACUTE PAIN OF RIGHT SHOULDER DUE TO TRAUMA: ICD-10-CM

## 2019-10-21 DIAGNOSIS — G89.11 ACUTE PAIN OF RIGHT SHOULDER DUE TO TRAUMA: ICD-10-CM

## 2019-10-21 PROCEDURE — 73221 MRI JOINT UPR EXTREM W/O DYE: CPT

## 2019-10-22 ENCOUNTER — CONSULT (OUTPATIENT)
Dept: ORTHOPEDIC SURGERY | Facility: CLINIC | Age: 54
End: 2019-10-22

## 2019-10-22 VITALS — TEMPERATURE: 98 F | HEIGHT: 63 IN | BODY MASS INDEX: 22.5 KG/M2 | WEIGHT: 127 LBS

## 2019-10-22 DIAGNOSIS — M75.01 ADHESIVE CAPSULITIS OF RIGHT SHOULDER: ICD-10-CM

## 2019-10-22 DIAGNOSIS — M25.511 RIGHT SHOULDER PAIN, UNSPECIFIED CHRONICITY: Primary | ICD-10-CM

## 2019-10-22 PROCEDURE — 73030 X-RAY EXAM OF SHOULDER: CPT | Performed by: ORTHOPAEDIC SURGERY

## 2019-10-22 PROCEDURE — 20610 DRAIN/INJ JOINT/BURSA W/O US: CPT | Performed by: ORTHOPAEDIC SURGERY

## 2019-10-22 PROCEDURE — 99214 OFFICE O/P EST MOD 30 MIN: CPT | Performed by: ORTHOPAEDIC SURGERY

## 2019-10-22 RX ORDER — MELOXICAM 15 MG/1
TABLET ORAL
Qty: 30 TABLET | Refills: 0 | Status: SHIPPED | OUTPATIENT
Start: 2019-10-22

## 2019-10-22 RX ADMIN — METHYLPREDNISOLONE ACETATE 80 MG: 80 INJECTION, SUSPENSION INTRA-ARTICULAR; INTRALESIONAL; INTRAMUSCULAR; SOFT TISSUE at 14:18

## 2019-10-22 NOTE — PROGRESS NOTES
New Right Shoulder      Patient: Adrian Aviles        YOB: 1965    Medical Record Number: [id number]        Chief Complaints: Right shoulder pain      History of Present Illness: This is a 54-year-old patient who presents complaining of right shoulder pain that her left hand dominant began in July when he had a foot fracture shoulder really started hurting 1 month after that no history of injury they do have significant night pain symptoms are moderate to severe constant crushing stabbing aching burning clicking worse with any kind of range of motion somewhat better with ice past medical history smart for depression anxiety      Allergies: No Known Allergies    Medications:   Home Medications:  Current Outpatient Medications on File Prior to Visit   Medication Sig   • CALCIUM PO Take 1,000 mg by mouth daily.   • Cholecalciferol (VITAMIN D PO) Take 2,000 Units by mouth. Does not specify if tablet or capsule.   • DHEA 50 MG tablet Take  by mouth.   • diazePAM (VALIUM) 5 MG tablet Take one tablet by mouth an hour before your MRI, then may repeat one tablet if needed on arrival for MRI.  Do not drive yourself to MRI.   • escitalopram (LEXAPRO) 10 MG tablet Take 10 mg by mouth daily.   • estradiol (MINIVELLE) 0.05 MG/24HR patch Place 1 patch on the skin 1 (One) Time Per Week.   • MAGNESIUM PO Take 500 mg by mouth daily. Does not specify tablets or capsules.   • progesterone (PROMETRIUM) 100 MG capsule Take 100 mg by mouth Daily.   • valACYclovir (VALTREX) 1000 MG tablet As Needed.   • vitamin B-12 (CYANOCOBALAMIN) 100 MCG tablet Take 50 mcg by mouth Daily.   • progesterone (PROMETRIUM) 200 MG capsule      No current facility-administered medications on file prior to visit.      Current Medications:  Scheduled Meds:  Continuous Infusions:  No current facility-administered medications for this visit.   PRN Meds:.    Past Medical History:   Diagnosis Date   • Anxiety    • Depression         Past Surgical  "History:   Procedure Laterality Date   • APPENDECTOMY     • BREAST AUGMENTATION     •  SECTION  1997   • CHOLECYSTECTOMY  1997   • OTHER SURGICAL HISTORY  10/2012    Uterine ablation        Social History     Occupational History   • Not on file   Tobacco Use   • Smoking status: Former Smoker   • Smokeless tobacco: Never Used   Substance and Sexual Activity   • Alcohol use: No   • Drug use: No   • Sexual activity: Defer      Social History     Social History Narrative   • Not on file        Family History   Problem Relation Age of Onset   • Lung disease Father    • Diabetes Sister    • Lung cancer Other              Review of Systems: 14 point review of systems are remarkable for the pertinent positives listed in the chart by the patient the remainder negative    Review of Systems      Physical Exam: 54 y.o. female  General Appearance:    Alert, cooperative, in no acute distress                   Vitals:    10/22/19 1350   Temp: 98 °F (36.7 °C)   Weight: 57.6 kg (127 lb)   Height: 160 cm (62.99\")      Patient is alert and read ×3 no acute distress appears her above-listed at height weight and age.  Affect is normal respiratory rate is normal unlabored. Heart rate regular rate rhythm, sclera, dentition and hearing are normal for the purpose of this exam.    Ortho Exam Physical exam of the right shoulder reveals no overlying skin changes no lymphedema no lymphadenopathy.  Patient has active flexion 150 with mild symptoms passively I can get them to about 160 abduction is similar external rotation is to 0 and internal rotation to their buttocks with  symptoms.  Patient has good rotator cuff strength 4+ over 5 with isometric strength testing with pain.  Patient has a positive impingement and a positive Mensah sign.  Patient has good cervical range of motion which is full and asymptomatic no radicular symptoms.  Patient has a normal elbow exam.  Good distal pulses are present        Large Joint " "Arthrocentesis: R glenohumeral  Date/Time: 10/22/2019 2:18 PM  Consent given by: patient  Site marked: site marked  Timeout: Immediately prior to procedure a time out was called to verify the correct patient, procedure, equipment, support staff and site/side marked as required   Supporting Documentation  Indications: pain   Procedure Details  Location: shoulder - R glenohumeral  Preparation: Patient was prepped and draped in the usual sterile fashion  Needle gauge: 21.  Medications administered: 4 mL lidocaine (cardiac); 80 mg methylPREDNISolone acetate 80 MG/ML  Patient tolerance: patient tolerated the procedure well with no immediate complications                Radiology:   AP, Scapular Y and Axillary Lateral of the right shoulder were ordered/reviewed to evauate shoulder pain.  I have no compared to films these show some mild acromioclavicular arthritis otherwise no acute bony pathologyShe also has an MRI which shows thickening of the axillary pouch suggestive of adhesive capsulitis no other acute pathology is appreciated  Imaging Results (most recent)     Procedure Component Value Units Date/Time    XR Shoulder 2+ View Right [[id number]] Resulted:  10/22/19 1348     Updated:  10/22/19 1348    Impression:       Ordering physician's impression is located in the Encounter Note dated 10/22/19. X-ray performed in the DR room.          Assessment/Plan: Right shoulder pain I think this is straightforward frozen shoulder which I discussed with her in detail plan is to proceed with an injection physical therapy we will make sure she is on a therapeutic dose of an anti-inflammatory i.e. meloxicam with strict precautions I will see her back in 4 weeks \"Cortisone Injection. See procedure note.  Cortisone Injection for DIAGNOSTIC and THERAPUTIC purposes.  "

## 2019-10-28 RX ORDER — METHYLPREDNISOLONE ACETATE 80 MG/ML
80 INJECTION, SUSPENSION INTRA-ARTICULAR; INTRALESIONAL; INTRAMUSCULAR; SOFT TISSUE
Status: COMPLETED | OUTPATIENT
Start: 2019-10-22 | End: 2019-10-22

## 2021-01-25 ENCOUNTER — OFFICE VISIT (OUTPATIENT)
Dept: ORTHOPEDIC SURGERY | Facility: CLINIC | Age: 56
End: 2021-01-25

## 2021-01-25 VITALS — BODY MASS INDEX: 23.04 KG/M2 | TEMPERATURE: 97.4 F | HEIGHT: 63 IN | WEIGHT: 130 LBS

## 2021-01-25 DIAGNOSIS — M79.642 LEFT HAND PAIN: Primary | ICD-10-CM

## 2021-01-25 PROCEDURE — 99214 OFFICE O/P EST MOD 30 MIN: CPT | Performed by: ORTHOPAEDIC SURGERY

## 2021-01-25 PROCEDURE — 20551 NJX 1 TENDON ORIGIN/INSJ: CPT | Performed by: ORTHOPAEDIC SURGERY

## 2021-01-25 PROCEDURE — 73130 X-RAY EXAM OF HAND: CPT | Performed by: ORTHOPAEDIC SURGERY

## 2021-01-25 RX ORDER — METHYLPREDNISOLONE ACETATE 80 MG/ML
80 INJECTION, SUSPENSION INTRA-ARTICULAR; INTRALESIONAL; INTRAMUSCULAR; SOFT TISSUE
Status: COMPLETED | OUTPATIENT
Start: 2021-01-25 | End: 2021-01-25

## 2021-01-25 RX ADMIN — METHYLPREDNISOLONE ACETATE 80 MG: 80 INJECTION, SUSPENSION INTRA-ARTICULAR; INTRALESIONAL; INTRAMUSCULAR; SOFT TISSUE at 15:08

## 2021-01-25 NOTE — PROGRESS NOTES
Patient: Adrian Aviles    YOB: 1965    Medical Record Number: [id number]    Chief Complaints:  Left thumb pain    History of Present Illness:     55 y.o. female patient who presents with recent history of left thumb pain.  Patient is unable to recall any specific inciting event or factor.  Reports pain is sharp, intermittent and moderate in severity.  The pain is associated with a mechanical snapping.  Symptoms alleviated by rest.  Symptoms aggravated by certain reaching, lifting movements.  Localizes pain to the base of the thumb.  Denies any paresthesias, numbness or weakness.  No other associated symptoms.  She is left hand dominant.    Allergies: No Known Allergies    Home Medications:      Current Outpatient Medications:   •  escitalopram (LEXAPRO) 10 MG tablet, Take 10 mg by mouth daily., Disp: , Rfl:   •  valACYclovir (VALTREX) 1000 MG tablet, As Needed., Disp: , Rfl:   •  vitamin B-12 (CYANOCOBALAMIN) 100 MCG tablet, Take 50 mcg by mouth Daily., Disp: , Rfl:   •  CALCIUM PO, Take 1,000 mg by mouth daily., Disp: , Rfl:   •  Cholecalciferol (VITAMIN D PO), Take 2,000 Units by mouth. Does not specify if tablet or capsule., Disp: , Rfl:   •  DHEA 50 MG tablet, Take  by mouth., Disp: , Rfl:   •  diazePAM (VALIUM) 5 MG tablet, Take one tablet by mouth an hour before your MRI, then may repeat one tablet if needed on arrival for MRI.  Do not drive yourself to MRI., Disp: 2 tablet, Rfl: 0  •  estradiol (MINIVELLE) 0.05 MG/24HR patch, Place 1 patch on the skin 1 (One) Time Per Week., Disp: , Rfl:   •  MAGNESIUM PO, Take 500 mg by mouth daily. Does not specify tablets or capsules., Disp: , Rfl:   •  meloxicam (MOBIC) 15 MG tablet, 1 PO Daily with food., Disp: 30 tablet, Rfl: 0  •  progesterone (PROMETRIUM) 100 MG capsule, Take 100 mg by mouth Daily., Disp: , Rfl:   •  progesterone (PROMETRIUM) 200 MG capsule, , Disp: , Rfl:     Past Medical History:   Diagnosis Date   • Anxiety    • Depression   "      Past Surgical History:   Procedure Laterality Date   • APPENDECTOMY     • BREAST AUGMENTATION     •  SECTION  1997   • CHOLECYSTECTOMY  1997   • OTHER SURGICAL HISTORY  10/2012    Uterine ablation       Social History     Occupational History   • Not on file   Tobacco Use   • Smoking status: Former Smoker   • Smokeless tobacco: Never Used   Substance and Sexual Activity   • Alcohol use: No   • Drug use: No   • Sexual activity: Defer      Social History     Social History Narrative   • Not on file       Family History   Problem Relation Age of Onset   • Lung disease Father    • Diabetes Sister    • Lung cancer Other        Review of Systems:      Constitutional: Denies fever, shaking or chills   Eyes: Denies change in visual acuity   HEENT: Denies nasal congestion or sore throat   Respiratory: Denies cough or shortness of breath   Cardiovascular: Denies chest pain or edema  Endocrine: Denies tremors, palpitations, intolerance of heat or cold, polyuria, polydipsia.  GI: Denies abdominal pain, nausea, vomiting, bloody stools or diarrhea  : Denies frequency, urgency, incontinence, retention, or nocturia.  Musculoskeletal: Denies numbness tingling or loss of motor function except as above  Integument: Denies rash, lesion or ulceration   Neurologic: Denies headache or focal weakness, deficits  Heme: Denies epistaxis, spontaneous or excessive bleeding, epistaxis, hematuria, melena, fatigue, enlarged or tender lymph nodes.      All other pertinent positives and negatives as noted above in HPI.    Physical Exam: 55 y.o. female  Vitals:    21 1443   Temp: 97.4 °F (36.3 °C)   Weight: 59 kg (130 lb)   Height: 160 cm (63\")       General:  Patient is awake and alert.  Appears in no acute distress or discomfort.    Psych:  Affect and demeanor are appropriate.    Eyes:  Conjunctiva and sclera appear grossly normal.  Eyes track well and EOM seem to be intact.    Ears:  No gross abnormalities.  Hearing " adequate for the exam.    Dentition:  No gross abnormalities noted.    Cardiovascular:  Regular rate and rhythm.    Lungs:  Good chest expansion.  Breathing unlabored.    Lymph:  No palpable masses or adenopathy in left upper extremity.    Left upper extremity:   Skin is benign.  No obvious swellings, masses or atrophy.  No palpable masses or adenopathy.  Focal tenderness at the A1 pulley at the base of the thumb.  Triggering of the thumb noted.  Full elbow, wrist range of motion.  No instability.  Negative thumb grind test.  Negative Finkelstein's test.  Negative Tinel's, Phalen's over carpal tunnel.  Good , pinch, finger and thumb abduction strength.  Intact sensation.  Good radial pulse.  Brisk cap refill         Radiology:   AP, oblique, and lateral views of the left hand are ordered by myself and reviewed to evaluate the patient's complaint.  No comparison films are immediately available.  The x-rays show no obvious acute abnormalities, lesions, masses, significant degenerative changes, or other concerning findings.    Assessment:  Left thumb stenosing tenosynovitis    Plan:  We discussed all available treatments for this condition in detail, both surgical and non-surgical.  With regards to conservative treatment, we discussed anti-inflammatories, injections, and use of a splint.  I have recommended that we start with an injection and the patient has agreed.  The risk, benefits and alternatives to the injection were thoroughly discussed.  The patient consented to proceed and the injection was performed as described below without complication.  Patient was instructed to follow-up with me if the symptoms persist or recur.    Ramon Gonzalez MD    01/25/2021    CC to Kaelyn Mathesw MD    Small Joint Arthrocentesis  Consent given by: patient  Site marked: site marked  Timeout: Immediately prior to procedure a time out was called to verify the correct patient, procedure, equipment, support staff and  site/side marked as required   Supporting Documentation  Indications: pain   Procedure Details  Location: thumb - Thumb joint: left thumb A-1 Pulley.  Preparation: Patient was prepped and draped in the usual sterile fashion  Needle size: 27 G  Approach: volar  Medications administered: 100 mg lidocaine (cardiac); 80 mg methylPREDNISolone acetate 80 MG/ML  Patient tolerance: patient tolerated the procedure well with no immediate complications

## 2021-10-01 ENCOUNTER — TELEPHONE (OUTPATIENT)
Dept: ORTHOPEDIC SURGERY | Facility: CLINIC | Age: 56
End: 2021-10-01

## 2021-10-01 NOTE — TELEPHONE ENCOUNTER
Caller: JACQUELINE JAEGER    Relationship to patient: SELF    Best call back number: [phone]    Chief complaint:  SPM PATIENT AND FRIEND. FOR 1-2 MONTHS PATIENT HAVING FEELING OF STONE BRUISE AND BURNING SENSATION UNDER 2ND/3RD METATARSAL AREA. HAS USED ICE FOR PAIN. NO INJURY. THIS IS SAME FOOT SPM TREATED FOR A 5TH METATARSAL FRACTURE FEW YEARS AGO SHE SAYS. SHE DOES NOT FEEL IT'S RELATED. DOES SPM WANT TO SEE PATIENT OR REFER?    Type of visit:  NEW PROBLEM

## 2022-10-28 ENCOUNTER — OFFICE VISIT (OUTPATIENT)
Dept: ORTHOPEDIC SURGERY | Facility: CLINIC | Age: 57
End: 2022-10-28

## 2022-10-28 VITALS — BODY MASS INDEX: 25.69 KG/M2 | HEIGHT: 63 IN | RESPIRATION RATE: 12 BRPM | TEMPERATURE: 97.5 F | WEIGHT: 145 LBS

## 2022-10-28 DIAGNOSIS — R52 PAIN: Primary | ICD-10-CM

## 2022-10-28 DIAGNOSIS — M77.12 LEFT TENNIS ELBOW: ICD-10-CM

## 2022-10-28 PROCEDURE — 20605 DRAIN/INJ JOINT/BURSA W/O US: CPT | Performed by: ORTHOPAEDIC SURGERY

## 2022-10-28 PROCEDURE — 99213 OFFICE O/P EST LOW 20 MIN: CPT | Performed by: ORTHOPAEDIC SURGERY

## 2022-10-28 PROCEDURE — 73070 X-RAY EXAM OF ELBOW: CPT | Performed by: ORTHOPAEDIC SURGERY

## 2022-10-28 RX ORDER — BUSPIRONE HYDROCHLORIDE 10 MG/1
TABLET ORAL
COMMUNITY
Start: 2022-10-13

## 2022-10-28 RX ORDER — METHYLPREDNISOLONE ACETATE 80 MG/ML
80 INJECTION, SUSPENSION INTRA-ARTICULAR; INTRALESIONAL; INTRAMUSCULAR; SOFT TISSUE
Status: COMPLETED | OUTPATIENT
Start: 2022-10-28 | End: 2022-10-28

## 2022-10-28 RX ORDER — BUPROPION HYDROCHLORIDE 150 MG/1
150 TABLET ORAL DAILY
COMMUNITY
Start: 2022-10-13

## 2022-10-28 RX ORDER — LORATADINE 10 MG/1
10 TABLET ORAL DAILY
COMMUNITY

## 2022-10-28 RX ADMIN — METHYLPREDNISOLONE ACETATE 80 MG: 80 INJECTION, SUSPENSION INTRA-ARTICULAR; INTRALESIONAL; INTRAMUSCULAR; SOFT TISSUE at 16:26

## 2022-10-28 NOTE — PROGRESS NOTES
CC:  Left elbow pain    Ms. Aviles is seen today for new complaint of left elbow pain.  This has been an issue for her for quite some time, gradually worsening.  Denies any specific inciting event or factor.  Localizes her pain to the lateral aspect of her elbow.  The pain is moderate, intermittent and stabbing.    Left elbow is examined.  Skin is benign.  No gross abnormalities on inspection.  No atrophy, swelling, or masses.  Focal tenderness at the lateral epicondyle.  No tenderness over the radial tunnel.  Elbow motion is full.  No instability.  Good strength with elbow flexion, extension, forearm pronation and supination, wrist flexion and extension, and .  There is significant pain over the lateral epicondyle with resisted wrist extension.  Sensation is intact.  Brisk capillary refill.  Palpable radial pulse.  Good skin turgor.    AP and lateral views of the left elbow are ordered by myself and reviewed to evaluate the patient's complaint.  No comparison films are immediately available.  The x-rays show no obvious acute abnormalities, lesions, masses, significant degenerative changes, or other concerning findings.    Assessment:  Left lateral epicondylitis    Plan: We discussed all available treatment options including anti-inflammatories, activity modifications, counterforce bracing, physical therapy and injections including both cortisone and PRP.  She elected to try cortisone injection today.  The risk, benefits and alternatives were discussed.  She consented and the injection was performed as described below.  She will follow-up as needed.    Ramon Gonzalez MD    Medium Joint Arthrocentesis: L elbow  Date/Time: 10/28/2022 4:26 PM  Consent given by: patient  Site marked: site marked  Timeout: Immediately prior to procedure a time out was called to verify the correct patient, procedure, equipment, support staff and site/side marked as required   Supporting Documentation  Indications: pain   Procedure  Details  Location: elbow - L elbow (Tennis elbow)  Needle size: 25 G  Approach: posterolateral  Medications administered: 80 mg methylPREDNISolone acetate 80 MG/ML; 2 mL lidocaine (cardiac)  Patient tolerance: patient tolerated the procedure well with no immediate complications

## 2023-04-11 ENCOUNTER — TELEPHONE (OUTPATIENT)
Dept: ORTHOPEDIC SURGERY | Facility: CLINIC | Age: 58
End: 2023-04-11

## 2023-04-11 NOTE — TELEPHONE ENCOUNTER
Caller: Adrian Aviles    Relationship to patient: Self    Best call back number:     Chief complaint: LEFT ELBOW     Type of visit: FUP INJECTION

## 2023-04-13 NOTE — TELEPHONE ENCOUNTER
Caller: Adrian Aviles    Relationship to patient: Self    Best call back number: [phone]    Chief complaint: LEFT ELBOW PAIN    Type of visit: INJECTION    Requested date: NEXT AVAILABLE APPT ( PATIENT DOES A FEW SPECIFIC DAYS/ WEEKS SHE IS WANTING TO LOOK AT.)    If rescheduling, when is the original appointment: 05.01.23 AT 10:40 AM    Additional notes: PATIENT WAS CALLING TO RESCHEDULE HER APPT WITH DR. NORRIS ON 05.01.23 DUE TO BEING OUT OF TOWN ON THIS DAY. THANK YOU!

## 2023-04-19 ENCOUNTER — CLINICAL SUPPORT (OUTPATIENT)
Dept: ORTHOPEDIC SURGERY | Facility: CLINIC | Age: 58
End: 2023-04-19
Payer: COMMERCIAL

## 2023-04-19 VITALS — WEIGHT: 134.4 LBS | BODY MASS INDEX: 23.81 KG/M2 | TEMPERATURE: 98 F | HEIGHT: 63 IN

## 2023-04-19 DIAGNOSIS — M77.12 LEFT TENNIS ELBOW: Primary | ICD-10-CM

## 2023-04-19 RX ORDER — METHYLPREDNISOLONE ACETATE 80 MG/ML
80 INJECTION, SUSPENSION INTRA-ARTICULAR; INTRALESIONAL; INTRAMUSCULAR; SOFT TISSUE
Status: COMPLETED | OUTPATIENT
Start: 2023-04-19 | End: 2023-04-19

## 2023-04-19 RX ORDER — LIDOCAINE HYDROCHLORIDE 10 MG/ML
2 INJECTION, SOLUTION EPIDURAL; INFILTRATION; INTRACAUDAL; PERINEURAL
Status: COMPLETED | OUTPATIENT
Start: 2023-04-19 | End: 2023-04-19

## 2023-04-19 RX ADMIN — LIDOCAINE HYDROCHLORIDE 2 ML: 10 INJECTION, SOLUTION EPIDURAL; INFILTRATION; INTRACAUDAL; PERINEURAL at 15:38

## 2023-04-19 RX ADMIN — METHYLPREDNISOLONE ACETATE 80 MG: 80 INJECTION, SUSPENSION INTRA-ARTICULAR; INTRALESIONAL; INTRAMUSCULAR; SOFT TISSUE at 15:38

## 2023-04-19 NOTE — PROGRESS NOTES
Ms. Aviles comes in today for follow-up.  Report the injection in October worked well in the past.  Reports she helped 2 sisters move which has caused her pain to recur.  She would like to get a repeat injection today.      The risks, benefits and alternatives were discussed and the patient consented.  The injection was performed as noted below.  I have fit her for an elbow brace as well.  Going forward, the patient will follow-up as needed.    MAYKEL Balbuena    04/19/2023        Medium Joint Arthrocentesis: L elbow  Date/Time: 4/19/2023 3:38 PM  Consent given by: patient  Site marked: site marked  Timeout: Immediately prior to procedure a time out was called to verify the correct patient, procedure, equipment, support staff and site/side marked as required   Supporting Documentation  Indications: pain   Procedure Details  Location: elbow - L elbow (Lateral epicondyle)  Preparation: Patient was prepped and draped in the usual sterile fashion  Needle size: 25 G  Approach: anterolateral  Medications administered: 80 mg methylPREDNISolone acetate 80 MG/ML; 2 mL lidocaine PF 1% 1 %  Patient tolerance: patient tolerated the procedure well with no immediate complications